# Patient Record
Sex: FEMALE | Race: WHITE | NOT HISPANIC OR LATINO | URBAN - METROPOLITAN AREA
[De-identification: names, ages, dates, MRNs, and addresses within clinical notes are randomized per-mention and may not be internally consistent; named-entity substitution may affect disease eponyms.]

---

## 2023-02-01 ENCOUNTER — OFFICE VISIT (OUTPATIENT)
Dept: OBGYN | Age: 28
End: 2023-02-01

## 2023-02-01 VITALS
DIASTOLIC BLOOD PRESSURE: 75 MMHG | BODY MASS INDEX: 23.21 KG/M2 | SYSTOLIC BLOOD PRESSURE: 107 MMHG | HEART RATE: 89 BPM | TEMPERATURE: 98 F | WEIGHT: 131 LBS | HEIGHT: 63 IN

## 2023-02-01 DIAGNOSIS — Z01.419 ENCOUNTER FOR WELL WOMAN EXAM WITH ROUTINE GYNECOLOGICAL EXAM: Primary | ICD-10-CM

## 2023-02-01 PROCEDURE — 99395 PREV VISIT EST AGE 18-39: CPT | Performed by: OBSTETRICS & GYNECOLOGY

## 2023-02-01 PROCEDURE — 88175 CYTOPATH C/V AUTO FLUID REDO: CPT | Performed by: INTERNAL MEDICINE

## 2023-02-01 ASSESSMENT — PATIENT HEALTH QUESTIONNAIRE - PHQ9
2. FEELING DOWN, DEPRESSED OR HOPELESS: NOT AT ALL
SUM OF ALL RESPONSES TO PHQ9 QUESTIONS 1 AND 2: 0
CLINICAL INTERPRETATION OF PHQ2 SCORE: NO FURTHER SCREENING NEEDED
SUM OF ALL RESPONSES TO PHQ9 QUESTIONS 1 AND 2: 0
1. LITTLE INTEREST OR PLEASURE IN DOING THINGS: NOT AT ALL

## 2023-02-03 LAB — HOLD SPECIMEN: NORMAL

## 2023-02-06 LAB
CASE RPRT: NORMAL
CLINICAL INFO: NORMAL
CYTOLOGY CVX/VAG STUDY: NORMAL
PAP EDUCATIONAL NOTE: NORMAL
SPECIMEN ADEQUACY: NORMAL

## 2023-08-31 ENCOUNTER — HOSPITAL ENCOUNTER (OUTPATIENT)
Age: 28
Discharge: HOME | End: 2023-08-31
Payer: COMMERCIAL

## 2023-08-31 VITALS
TEMPERATURE: 99 F | OXYGEN SATURATION: 100 % | RESPIRATION RATE: 18 BRPM | DIASTOLIC BLOOD PRESSURE: 69 MMHG | HEART RATE: 71 BPM | SYSTOLIC BLOOD PRESSURE: 101 MMHG

## 2023-08-31 DIAGNOSIS — J02.9 VIRAL PHARYNGITIS: Primary | ICD-10-CM

## 2023-08-31 LAB — S PYO AG THROAT QL: NEGATIVE

## 2023-08-31 PROCEDURE — 87880 STREP A ASSAY W/OPTIC: CPT | Performed by: NURSE PRACTITIONER

## 2023-08-31 PROCEDURE — 99203 OFFICE O/P NEW LOW 30 MIN: CPT | Performed by: NURSE PRACTITIONER

## 2023-08-31 RX ORDER — AMOXICILLIN 875 MG/1
875 TABLET, COATED ORAL 2 TIMES DAILY
COMMUNITY
Start: 2023-04-13

## 2024-09-03 NOTE — ED PROVIDER NOTES
Patient Seen in: [facility]      History     Chief Complaint   Patient presents with    Abdominal Pain     Stated Complaint: abdominal pain  Subjective:   Crystal is a 28-year-old female presenting to the immediate care complaining of abdominal pain.  Patient states that she has had pain to her left lower quadrant for the past 4 days which acutely worsened at about 4:00 this morning.  Patient was awake and working when the pain started.  Pain did not wake her from sleep.  Patient states the pain has been persistent since onset.  She denies any fever, vomiting, diarrhea, constipation, urinary complaints, vaginal bleeding or discharge.  No concern for STDs.  Denies any recent blood in her stool.  Patient states that she has some mild nausea without vomiting.  Patient has been taking ibuprofen for pain with not much relief.  Denies any known history of diverticulosis.  Denies any known history of ovarian cysts.  Last menstrual period was a week ago, finished 4 days ago.  She has a decreased appetite but is tolerating p.o. fluids well and is well-hydrated.  She denies any other concerns or complaints.        Objective:   History reviewed. No pertinent past medical history.         No pertinent past surgical history.            No pertinent social history.          Review of Systems    Positive for stated complaint: Abdominal Pain    Other systems are as noted in HPI.  Constitutional and vital signs reviewed.      All other systems reviewed and negative except as noted above.    Physical Exam     ED Triage Vitals [09/03/24 0838]   /75   Pulse 78   Resp 18   Temp 97.2 °F (36.2 °C)   Temp src Temporal   SpO2 100 %   O2 Device None (Room air)     Current:/75   Pulse 78   Temp 97.2 °F (36.2 °C) (Temporal)   Resp 18   LMP 08/31/2024   SpO2 100%     Physical Exam  Vitals and nursing note reviewed.   Constitutional:       General: She is not in acute distress.     Appearance: Normal appearance.  She is not ill-appearing, toxic-appearing or diaphoretic.   HENT:      Head: Normocephalic.   Cardiovascular:      Rate and Rhythm: Normal rate.   Pulmonary:      Effort: Pulmonary effort is normal. No respiratory distress.   Abdominal:      General: Abdomen is flat. Bowel sounds are normal. There is no distension.      Palpations: Abdomen is soft. There is no mass.      Tenderness: There is abdominal tenderness in the left lower quadrant. There is no right CVA tenderness, left CVA tenderness, guarding or rebound.      Hernia: No hernia is present.   Musculoskeletal:         General: Normal range of motion.      Cervical back: Normal range of motion.   Skin:     General: Skin is warm and dry.      Capillary Refill: Capillary refill takes less than 2 seconds.   Neurological:      General: No focal deficit present.      Mental Status: She is alert and oriented to person, place, and time.   Psychiatric:         Mood and Affect: Mood normal.         Behavior: Behavior normal.         Thought Content: Thought content normal.         Judgment: Judgment normal.         ED Course   Radiology:  CT ABDOMEN+PELVIS(CONTRAST ONLY)(CPT=74177)    Result Date: 9/3/2024  CONCLUSION:   No acute abnormality in the abdomen or pelvis.  Lesser incidental findings described above.     Dictated by (CST): Oumar Victor MD on 9/03/2024 at 10:58 AM     Finalized by (CST): Oumar Victor MD on 9/03/2024 at 11:03 AM          US PELVIS EV W DOPPLER (CPT=76856/08383/79309)    Result Date: 9/3/2024  CONCLUSION:   1. Normal uterine sonogram with normal thickness endometrium. 2. Normal bilateral wrist simple subcentimeter follicles.  Benign anechoic left para ovarian cyst.  3. Normal bilateral ovarian Doppler without torsion    Dictated by (CST): Avinash Donnelly MD on 9/03/2024 at 9:46 AM     Finalized by (CST): Avinash Donnelly MD on 9/03/2024 at 9:54 AM         Labs Reviewed   [facility] POCT URINALYSIS DIPSTICK - Abnormal; Notable for the  following components:       Result Value    Blood, Urine Trace-lysed (*)     All other components within normal limits   POCT ISTAT CHEM8 CARTRIDGE - Abnormal; Notable for the following components:    ISTAT Glucose 101 (*)     All other components within normal limits   POCT PREGNANCY URINE - Normal   POCT CBC       MDM     Medical Decision Making  Differential diagnoses reflecting the complexity of care include but are not limited to ovarian cyst, ovarian torsion, hemorrhagic cyst, diverticulitis, colitis.    Comorbidities that add complexity to management include: None  History obtained by an independent source was from: Patient  Patient is well appearing, non-toxic and in no acute distress.  Vital signs are stable.   28-year-old female presenting to the immediate care complaining of 2 to 3 days of left lower quadrant abdominal pain that acutely worsened overnight.  Also complains of some nausea without vomiting.  She appears uncomfortable.  I discussed pain management with the patient and she prefers to not go to the emergency department for pain management.  I did offer her Norco if she could find a ride because she looks very uncomfortable but she states that she would prefer to wait and not call for a ride.  Labs, ultrasound and CT scan were done.  Labs were all unremarkable; pregnancy test was negative.    Ultrasound of pelvis shows a left ovarian cyst.  There is no hemorrhage or torsion.  The CT scan of re-demonstrates a left ovarian cyst.  There is also a small liver lesion on CT that is likely due to a cyst.  Recommended follow-up with gynecologist regarding the ovarian cyst.  Also recommended follow-up with primary care doctor regarding the lesion.  Recommended Tylenol or Motrin for pain. I did send you a prescription for Norco for severe pain with drowsiness precautions.  Discussed 4g Max tylenol/24 hours; patient is an RN and understands.   Also sent a prescription for Zofran for the  nausea.  Recommended that if the patient develops any acutely worsening pain, bleeding or any other worsening or concerning complaints she should go to the emergency department.  Otherwise please follow-up with primary care doctor.    ED precautions discussed.  Patient (guardian) advised to follow up with PCP in 2-3 days.  Patient (guardian) agrees with this plan of care.  Patient (guardian) verbalizes understanding of discharge instructions and plan of care.  Patient discussed with Dr. Fong on the phone who agrees with this plan.      Amount and/or Complexity of Data Reviewed  Labs: ordered. Decision-making details documented in ED Course.  Radiology: ordered. Decision-making details documented in ED Course.    Risk  OTC drugs.  Prescription drug management.        Disposition and Plan     Clinical Impression:  1. Cyst of left ovary    2. Left lower quadrant abdominal tenderness with rebound tenderness         Disposition:  Discharge  9/3/2024 11:36 am    Follow-up:  Cathi Goodwin MD  [address] [facility]  [address]  [place] [id number] [phone]          Dimple Dasilva MD  [address]  [facility] [id number] [phone]                Medications Prescribed:  Discharge Medication List as of 9/3/2024 11:44 AM        START taking these medications    Details   ondansetron 4 MG Oral Tablet Dispersible Take 1 tablet (4 mg total) by mouth every 4 (four) hours as needed for Nausea., Normal, Disp-10 tablet, R-0      HYDROcodone-acetaminophen 5-325 MG Oral Tab Take 1 tablet by mouth every 6 (six) hours as needed for Pain., Normal, Disp-10 tablet, R-0

## 2024-09-03 NOTE — DISCHARGE INSTRUCTIONS
Your labs were all unremarkable.    Ultrasound of your pelvis shows a left ovarian cyst.  There is no hemorrhage or twisting of the ovary.  The CT scan of your abdomen demonstrates a left ovarian cyst.  There is also a small liver lesion on your CT that is likely due to a cyst.    Please follow-up with your gynecologist regarding the ovarian cyst.  I would follow-up with your primary care doctor regarding the lesion.    You can take Tylenol or Motrin for pain.  I would recommend alternating them every 4 hours as discussed taking each medication every 8 hours.  I did send you a prescription for Norco for severe pain.  Please remember the Norco will make you drowsy so no driving or drinking alcohol when you take it.    If you develop any acutely worsening pain, bleeding or any other worsening or concerning complaints you should go to the emergency department.  Otherwise please follow-up with your primary care doctor.